# Patient Record
Sex: FEMALE | Race: WHITE | NOT HISPANIC OR LATINO | URBAN - METROPOLITAN AREA
[De-identification: names, ages, dates, MRNs, and addresses within clinical notes are randomized per-mention and may not be internally consistent; named-entity substitution may affect disease eponyms.]

---

## 2017-05-02 ENCOUNTER — EMERGENCY (EMERGENCY)
Facility: HOSPITAL | Age: 27
LOS: 1 days | Discharge: HOME | End: 2017-05-02
Attending: EMERGENCY MEDICINE
Payer: COMMERCIAL

## 2017-05-02 VITALS
DIASTOLIC BLOOD PRESSURE: 94 MMHG | SYSTOLIC BLOOD PRESSURE: 131 MMHG | HEART RATE: 128 BPM | WEIGHT: 149.91 LBS | HEIGHT: 62 IN | TEMPERATURE: 98 F | RESPIRATION RATE: 19 BRPM | OXYGEN SATURATION: 96 %

## 2017-05-02 DIAGNOSIS — R45.851 SUICIDAL IDEATIONS: ICD-10-CM

## 2017-05-02 DIAGNOSIS — Z87.09 PERSONAL HISTORY OF OTHER DISEASES OF THE RESPIRATORY SYSTEM: Chronic | ICD-10-CM

## 2017-05-02 DIAGNOSIS — Z90.89 ACQUIRED ABSENCE OF OTHER ORGANS: ICD-10-CM

## 2017-05-02 DIAGNOSIS — F10.10 ALCOHOL ABUSE, UNCOMPLICATED: ICD-10-CM

## 2017-05-02 DIAGNOSIS — F41.9 ANXIETY DISORDER, UNSPECIFIED: ICD-10-CM

## 2017-05-02 DIAGNOSIS — Z98.890 OTHER SPECIFIED POSTPROCEDURAL STATES: Chronic | ICD-10-CM

## 2017-05-02 LAB
ALBUMIN SERPL ELPH-MCNC: 4.5 G/DL — SIGNIFICANT CHANGE UP (ref 3.3–5.2)
ALP SERPL-CCNC: 46 U/L — SIGNIFICANT CHANGE UP (ref 40–120)
ALT FLD-CCNC: 20 U/L — SIGNIFICANT CHANGE UP
AMPHET UR-MCNC: NEGATIVE — SIGNIFICANT CHANGE UP
ANION GAP SERPL CALC-SCNC: 17 MMOL/L — SIGNIFICANT CHANGE UP (ref 5–17)
APAP SERPL-MCNC: <7.5 UG/ML — LOW (ref 10–26)
APPEARANCE UR: CLEAR — SIGNIFICANT CHANGE UP
AST SERPL-CCNC: 18 U/L — SIGNIFICANT CHANGE UP
BARBITURATES UR SCN-MCNC: NEGATIVE — SIGNIFICANT CHANGE UP
BASOPHILS # BLD AUTO: 0 K/UL — SIGNIFICANT CHANGE UP (ref 0–0.2)
BASOPHILS NFR BLD AUTO: 0.2 % — SIGNIFICANT CHANGE UP (ref 0–2)
BENZODIAZ UR-MCNC: NEGATIVE — SIGNIFICANT CHANGE UP
BILIRUB SERPL-MCNC: 0.2 MG/DL — LOW (ref 0.4–2)
BILIRUB UR-MCNC: NEGATIVE — SIGNIFICANT CHANGE UP
BUN SERPL-MCNC: 13 MG/DL — SIGNIFICANT CHANGE UP (ref 8–20)
CALCIUM SERPL-MCNC: 9.2 MG/DL — SIGNIFICANT CHANGE UP (ref 8.6–10.2)
CHLORIDE SERPL-SCNC: 105 MMOL/L — SIGNIFICANT CHANGE UP (ref 98–107)
CO2 SERPL-SCNC: 23 MMOL/L — SIGNIFICANT CHANGE UP (ref 22–29)
COCAINE METAB.OTHER UR-MCNC: NEGATIVE — SIGNIFICANT CHANGE UP
COLOR SPEC: YELLOW — SIGNIFICANT CHANGE UP
CREAT SERPL-MCNC: 0.56 MG/DL — SIGNIFICANT CHANGE UP (ref 0.5–1.3)
DIFF PNL FLD: NEGATIVE — SIGNIFICANT CHANGE UP
EOSINOPHIL # BLD AUTO: 0 K/UL — SIGNIFICANT CHANGE UP (ref 0–0.5)
EOSINOPHIL NFR BLD AUTO: 0.7 % — SIGNIFICANT CHANGE UP (ref 0–6)
ETHANOL SERPL-MCNC: 216 MG/DL — SIGNIFICANT CHANGE UP
GLUCOSE SERPL-MCNC: 107 MG/DL — SIGNIFICANT CHANGE UP (ref 70–115)
GLUCOSE UR QL: NEGATIVE MG/DL — SIGNIFICANT CHANGE UP
HCG SERPL-ACNC: <2 MIU/ML — SIGNIFICANT CHANGE UP
HCT VFR BLD CALC: 41.4 % — SIGNIFICANT CHANGE UP (ref 37–47)
HGB BLD-MCNC: 14 G/DL — SIGNIFICANT CHANGE UP (ref 12–16)
KETONES UR-MCNC: NEGATIVE — SIGNIFICANT CHANGE UP
LEUKOCYTE ESTERASE UR-ACNC: NEGATIVE — SIGNIFICANT CHANGE UP
LYMPHOCYTES # BLD AUTO: 1.4 K/UL — SIGNIFICANT CHANGE UP (ref 1–4.8)
LYMPHOCYTES # BLD AUTO: 24 % — SIGNIFICANT CHANGE UP (ref 20–55)
MCHC RBC-ENTMCNC: 31.7 PG — HIGH (ref 27–31)
MCHC RBC-ENTMCNC: 33.8 G/DL — SIGNIFICANT CHANGE UP (ref 32–36)
MCV RBC AUTO: 93.9 FL — SIGNIFICANT CHANGE UP (ref 81–99)
METHADONE UR-MCNC: NEGATIVE — SIGNIFICANT CHANGE UP
MONOCYTES # BLD AUTO: 0.4 K/UL — SIGNIFICANT CHANGE UP (ref 0–0.8)
MONOCYTES NFR BLD AUTO: 7.5 % — SIGNIFICANT CHANGE UP (ref 3–10)
NEUTROPHILS # BLD AUTO: 4 K/UL — SIGNIFICANT CHANGE UP (ref 1.8–8)
NEUTROPHILS NFR BLD AUTO: 67.4 % — SIGNIFICANT CHANGE UP (ref 37–73)
NITRITE UR-MCNC: NEGATIVE — SIGNIFICANT CHANGE UP
OPIATES UR-MCNC: NEGATIVE — SIGNIFICANT CHANGE UP
PCP SPEC-MCNC: SIGNIFICANT CHANGE UP
PCP UR-MCNC: NEGATIVE — SIGNIFICANT CHANGE UP
PH UR: 6 — SIGNIFICANT CHANGE UP (ref 5–8)
PLATELET # BLD AUTO: 239 K/UL — SIGNIFICANT CHANGE UP (ref 150–400)
POTASSIUM SERPL-MCNC: 3.8 MMOL/L — SIGNIFICANT CHANGE UP (ref 3.5–5.3)
POTASSIUM SERPL-SCNC: 3.8 MMOL/L — SIGNIFICANT CHANGE UP (ref 3.5–5.3)
PROT SERPL-MCNC: 7.2 G/DL — SIGNIFICANT CHANGE UP (ref 6.6–8.7)
PROT UR-MCNC: NEGATIVE MG/DL — SIGNIFICANT CHANGE UP
RBC # BLD: 4.41 M/UL — SIGNIFICANT CHANGE UP (ref 4.4–5.2)
RBC # FLD: 13.6 % — SIGNIFICANT CHANGE UP (ref 11–15.6)
SALICYLATES SERPL-MCNC: <2 MG/DL — LOW (ref 10–20)
SODIUM SERPL-SCNC: 145 MMOL/L — SIGNIFICANT CHANGE UP (ref 135–145)
SP GR SPEC: 1.02 — SIGNIFICANT CHANGE UP (ref 1.01–1.02)
THC UR QL: NEGATIVE — SIGNIFICANT CHANGE UP
UROBILINOGEN FLD QL: 1 MG/DL
WBC # BLD: 5.9 K/UL — SIGNIFICANT CHANGE UP (ref 4.8–10.8)
WBC # FLD AUTO: 5.9 K/UL — SIGNIFICANT CHANGE UP (ref 4.8–10.8)

## 2017-05-02 PROCEDURE — 99284 EMERGENCY DEPT VISIT MOD MDM: CPT

## 2017-05-02 NOTE — ED BEHAVIORAL HEALTH NOTE - BEHAVIORAL HEALTH NOTE
Patient father and stepmother Janie called and disclosed that she has abusing "pills, alcohol and cocaine". She has hx of SI with 2 past attempts. She has been to rehab  in [place] for 28 days. She has no hx of psychiatric tx but was in therapy in the past. Her mother Hellen ([phone]) called and reported that she tried to take a handful of pills in front of her tonight complaining that her life is horrible and she just wants to die. Her mother called 911 for an ambulance and the patient told her mother that "ill talk my way out of the hospital and ill kill myself when I get home".

## 2017-05-02 NOTE — ED ADULT NURSE NOTE - CHPI ED SYMPTOMS NEG
no change in level of consciousness/no agitation/no paranoia/no suicidal/no confusion/no homicidal/no weakness/no hallucinations/no disorientation

## 2017-05-02 NOTE — ED PROVIDER NOTE - NS ED MD SCRIBE ATTENDING SCRIBE SECTIONS
HISTORY OF PRESENT ILLNESS/PHYSICAL EXAM/VITAL SIGNS( Pullset)/PAST MEDICAL/SURGICAL/SOCIAL HISTORY/DISPOSITION/HIV/REVIEW OF SYSTEMS/INTAKE ASSESSMENT/SCREENINGS/RESULTS

## 2017-05-02 NOTE — ED PROVIDER NOTE - OBJECTIVE STATEMENT
27 y/o F presents to ED stating "this is a misunderstanding". Pt states she got into an argument with mother tonight. Pt states mother believed she was suicidal after taking 3 tabs of melatonin tonight and called EMS. Pt denies overdose. She denies hx of depression. Pt denies psych hx. She denies SI/HI.    As per  note, pt has hx of suicidal attempts x 2 in the past and has been in therapy/rehab. Collateral info obtained from mother. Pt voiced SI tonight and attempted to overdose by taking a hand full of pills.

## 2017-05-02 NOTE — ED ADULT NURSE NOTE - DISCHARGE TEACHING
return to local ED or call 911 if symptoms worsen or thoughts to harm self or others develop, follow up with resource referral as per SBIRT

## 2017-05-02 NOTE — ED ADULT NURSE NOTE - OBJECTIVE STATEMENT
Pt received in [facility] s/p "suicidal attempt" as per mother pt took 3 melatonin pills. Pt denies taking such pills. Pt admits to drinking about 4 shots of vodka while laying by her pool this afternoon. Pt denies drinking everyday. Pt with no medical hx. Pt states her mother and stepfather are "crazy and controlling" and that this entire thing is "absolutely ridiculous". Pt is a&ox3, speaking coherently and following commands. Respirations are even and unlabored with no sx's of SOB. Pt denies SI/HI. Clothing/belongings locked up.

## 2017-05-02 NOTE — ED ADULT TRIAGE NOTE - CHIEF COMPLAINT QUOTE
pt biba with reports of possible SI by mother at scene as per EMS. pt admits to alcohol, denies taking pills. 3 unknown pills found by patient, suspected to be melatonin by mother. pt biba with reports of possible SI by mother at scene as per EMS. pt admits to alcohol, denies taking pills. 3 pills found with patient at home, suspected to be melatonin by mother, and confirmed by patient that they are melatonin. pt cooperative upon arrival.

## 2017-05-02 NOTE — ED ADULT NURSE NOTE - CHIEF COMPLAINT QUOTE
pt biba with reports of possible SI by mother at scene as per EMS. pt admits to alcohol, denies taking pills. 3 pills found with patient at home, suspected to be melatonin by mother, and confirmed by patient that they are melatonin. pt cooperative upon arrival.

## 2017-05-02 NOTE — ED PROVIDER NOTE - CARE PLAN
Principal Discharge DX:	Depression Principal Discharge DX:	Anxiety state  Secondary Diagnosis:	Alcohol abuse

## 2017-05-03 VITALS
RESPIRATION RATE: 20 BRPM | HEART RATE: 98 BPM | OXYGEN SATURATION: 98 % | DIASTOLIC BLOOD PRESSURE: 90 MMHG | TEMPERATURE: 98 F | SYSTOLIC BLOOD PRESSURE: 129 MMHG

## 2017-05-03 DIAGNOSIS — F10.10 ALCOHOL ABUSE, UNCOMPLICATED: ICD-10-CM

## 2017-05-03 DIAGNOSIS — F41.1 GENERALIZED ANXIETY DISORDER: ICD-10-CM

## 2017-05-03 DIAGNOSIS — R69 ILLNESS, UNSPECIFIED: ICD-10-CM

## 2017-05-03 LAB — ETHANOL SERPL-MCNC: 106 MG/DL — SIGNIFICANT CHANGE UP

## 2017-05-03 PROCEDURE — 80307 DRUG TEST PRSMV CHEM ANLYZR: CPT

## 2017-05-03 PROCEDURE — 81003 URINALYSIS AUTO W/O SCOPE: CPT

## 2017-05-03 PROCEDURE — 85027 COMPLETE CBC AUTOMATED: CPT

## 2017-05-03 PROCEDURE — 99284 EMERGENCY DEPT VISIT MOD MDM: CPT | Mod: 25

## 2017-05-03 PROCEDURE — 80053 COMPREHEN METABOLIC PANEL: CPT

## 2017-05-03 PROCEDURE — 84702 CHORIONIC GONADOTROPIN TEST: CPT

## 2017-05-03 PROCEDURE — 90792 PSYCH DIAG EVAL W/MED SRVCS: CPT
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
## 2017-05-03 NOTE — ED ADULT NURSE REASSESSMENT NOTE - NS ED NURSE REASSESS COMMENT FT1
Pt. currently sleeping with 1:1 at the bedside. Waiting for BAL to decrease and talk to psych. Will continue to monitor.
Assumed care at 0730, resting in bed, no attempts to harm self or others, and safety maintained.
Pt arrived to  AOx3,calm and cooperative. Pt smiling during assessment. Pt reports she was day drinking yesterday and got into a fight with her mother. As per pt, "I don't remember exactly what I said but I said things I didn't mean." Pt denies any current SI/HI or any A/V/H. Pt reports she feels like she needs to see a psychiatrist for her mood swings and for her anxiety. Pt reports she has been feeling anxious for the past 8 months. Pt reports she has a problem with ETOH. Pt has been to rehab 3 years ago for ETOH abuse. Pt has no psychiatric hx and denies any past SA. Pt is on no medication. Pt's belongings securely locked up in . Will continue to monitor the pt for safety.
Pt transferred to  unit at this time to SHIL[facility]. Pt is a&ox3, speaking coherently, and following commands. Pt with steady gait to  unit. Bedside report given.

## 2017-05-03 NOTE — SBIRT NOTE. - NSSBIRTSERVICES_GEN_A_ED_FT
Naloxone Rescue Kit dispensed: Pt was educated about Naloxone and trained on how to assemble and utilize the kit.[id number]  Provided SBIRT services: Full screen positive. Referral to Treatment Performed. Screening results were reviewed with the patient and patient was provided information about healthy guidelines and potential negative consequences associated with level of risk. Motivation and readiness to reduce or stop use was discussed and goals and activities to make changes were suggested/offered.  Referral for complete assessment and level of care determination at a certified treatment facility was completed by contacting the treatment facility via phone, and add apt info as noted below:  Appt confirmed at: [facility] - Awaiting bed   Audit Score:33  DAST Score:3  Duration = 60 Minutes

## 2017-05-03 NOTE — ED BEHAVIORAL HEALTH ASSESSMENT NOTE - SUMMARY
27 yoF here after incident at home with alcohol excess and possible suicide threat; patient states that she has a problem with drinking too much alcohol and is motivated to get outpatient help.  Pt denies SI/HI, self-injurious behaviors, hallucinations/delusions/illusions.   Patient and mother consulted with SBIRT and currently deciding between inpatient and outpatient options that are suitable for patient.

## 2017-05-03 NOTE — ED ADULT NURSE REASSESSMENT NOTE - COMFORT CARE
wait time explained/po fluids offered/warm blanket provided/ambulated to bathroom/plan of care explained/darkened lights/repositioned

## 2018-01-22 ENCOUNTER — EMERGENCY (EMERGENCY)
Facility: HOSPITAL | Age: 28
LOS: 1 days | Discharge: HOME | End: 2018-01-22
Attending: EMERGENCY MEDICINE | Admitting: EMERGENCY MEDICINE
Payer: COMMERCIAL

## 2018-01-22 VITALS
SYSTOLIC BLOOD PRESSURE: 120 MMHG | RESPIRATION RATE: 18 BRPM | HEART RATE: 80 BPM | DIASTOLIC BLOOD PRESSURE: 74 MMHG | TEMPERATURE: 98 F | HEIGHT: 62 IN | WEIGHT: 125 LBS | OXYGEN SATURATION: 100 %

## 2018-01-22 VITALS
HEART RATE: 98 BPM | RESPIRATION RATE: 18 BRPM | SYSTOLIC BLOOD PRESSURE: 113 MMHG | OXYGEN SATURATION: 98 % | DIASTOLIC BLOOD PRESSURE: 69 MMHG | TEMPERATURE: 98 F

## 2018-01-22 DIAGNOSIS — Z87.09 PERSONAL HISTORY OF OTHER DISEASES OF THE RESPIRATORY SYSTEM: Chronic | ICD-10-CM

## 2018-01-22 DIAGNOSIS — Z98.890 OTHER SPECIFIED POSTPROCEDURAL STATES: Chronic | ICD-10-CM

## 2018-01-22 DIAGNOSIS — F19.10 OTHER PSYCHOACTIVE SUBSTANCE ABUSE, UNCOMPLICATED: ICD-10-CM

## 2018-01-22 DIAGNOSIS — F10.129 ALCOHOL ABUSE WITH INTOXICATION, UNSPECIFIED: ICD-10-CM

## 2018-01-22 DIAGNOSIS — F15.10 OTHER STIMULANT ABUSE, UNCOMPLICATED: ICD-10-CM

## 2018-01-22 DIAGNOSIS — F10.10 ALCOHOL ABUSE, UNCOMPLICATED: ICD-10-CM

## 2018-01-22 LAB
ALBUMIN SERPL ELPH-MCNC: 4.8 G/DL — SIGNIFICANT CHANGE UP (ref 3.3–5.2)
ALP SERPL-CCNC: 72 U/L — SIGNIFICANT CHANGE UP (ref 40–120)
ALT FLD-CCNC: 17 U/L — SIGNIFICANT CHANGE UP
AMPHET UR-MCNC: NEGATIVE — SIGNIFICANT CHANGE UP
ANION GAP SERPL CALC-SCNC: 15 MMOL/L — SIGNIFICANT CHANGE UP (ref 5–17)
APPEARANCE UR: CLEAR — SIGNIFICANT CHANGE UP
AST SERPL-CCNC: 23 U/L — SIGNIFICANT CHANGE UP
BACTERIA # UR AUTO: ABNORMAL
BARBITURATES UR SCN-MCNC: NEGATIVE — SIGNIFICANT CHANGE UP
BASOPHILS # BLD AUTO: 0 K/UL — SIGNIFICANT CHANGE UP (ref 0–0.2)
BASOPHILS NFR BLD AUTO: 0.2 % — SIGNIFICANT CHANGE UP (ref 0–2)
BENZODIAZ UR-MCNC: NEGATIVE — SIGNIFICANT CHANGE UP
BILIRUB SERPL-MCNC: 0.2 MG/DL — LOW (ref 0.4–2)
BILIRUB UR-MCNC: NEGATIVE — SIGNIFICANT CHANGE UP
BUN SERPL-MCNC: 9 MG/DL — SIGNIFICANT CHANGE UP (ref 8–20)
CALCIUM SERPL-MCNC: 9.9 MG/DL — SIGNIFICANT CHANGE UP (ref 8.6–10.2)
CHLORIDE SERPL-SCNC: 106 MMOL/L — SIGNIFICANT CHANGE UP (ref 98–107)
CO2 SERPL-SCNC: 25 MMOL/L — SIGNIFICANT CHANGE UP (ref 22–29)
COCAINE METAB.OTHER UR-MCNC: NEGATIVE — SIGNIFICANT CHANGE UP
COLOR SPEC: YELLOW — SIGNIFICANT CHANGE UP
CREAT SERPL-MCNC: 0.62 MG/DL — SIGNIFICANT CHANGE UP (ref 0.5–1.3)
DIFF PNL FLD: ABNORMAL
EOSINOPHIL # BLD AUTO: 0 K/UL — SIGNIFICANT CHANGE UP (ref 0–0.5)
EOSINOPHIL NFR BLD AUTO: 0.4 % — SIGNIFICANT CHANGE UP (ref 0–6)
EPI CELLS # UR: SIGNIFICANT CHANGE UP
ETHANOL SERPL-MCNC: 140 MG/DL — SIGNIFICANT CHANGE UP
GLUCOSE SERPL-MCNC: 105 MG/DL — SIGNIFICANT CHANGE UP (ref 70–115)
GLUCOSE UR QL: NEGATIVE MG/DL — SIGNIFICANT CHANGE UP
HCT VFR BLD CALC: 43.5 % — SIGNIFICANT CHANGE UP (ref 37–47)
HGB BLD-MCNC: 14.8 G/DL — SIGNIFICANT CHANGE UP (ref 12–16)
KETONES UR-MCNC: NEGATIVE — SIGNIFICANT CHANGE UP
LEUKOCYTE ESTERASE UR-ACNC: ABNORMAL
LYMPHOCYTES # BLD AUTO: 1.2 K/UL — SIGNIFICANT CHANGE UP (ref 1–4.8)
LYMPHOCYTES # BLD AUTO: 23.5 % — SIGNIFICANT CHANGE UP (ref 20–55)
MCHC RBC-ENTMCNC: 31.8 PG — HIGH (ref 27–31)
MCHC RBC-ENTMCNC: 34 G/DL — SIGNIFICANT CHANGE UP (ref 32–36)
MCV RBC AUTO: 93.3 FL — SIGNIFICANT CHANGE UP (ref 81–99)
METHADONE UR-MCNC: NEGATIVE — SIGNIFICANT CHANGE UP
MONOCYTES # BLD AUTO: 0.3 K/UL — SIGNIFICANT CHANGE UP (ref 0–0.8)
MONOCYTES NFR BLD AUTO: 5.5 % — SIGNIFICANT CHANGE UP (ref 3–10)
NEUTROPHILS # BLD AUTO: 3.7 K/UL — SIGNIFICANT CHANGE UP (ref 1.8–8)
NEUTROPHILS NFR BLD AUTO: 70.2 % — SIGNIFICANT CHANGE UP (ref 37–73)
NITRITE UR-MCNC: NEGATIVE — SIGNIFICANT CHANGE UP
OPIATES UR-MCNC: NEGATIVE — SIGNIFICANT CHANGE UP
PCP SPEC-MCNC: SIGNIFICANT CHANGE UP
PCP UR-MCNC: NEGATIVE — SIGNIFICANT CHANGE UP
PH UR: 6.5 — SIGNIFICANT CHANGE UP (ref 5–8)
PLATELET # BLD AUTO: 241 K/UL — SIGNIFICANT CHANGE UP (ref 150–400)
POTASSIUM SERPL-MCNC: 4.4 MMOL/L — SIGNIFICANT CHANGE UP (ref 3.5–5.3)
POTASSIUM SERPL-SCNC: 4.4 MMOL/L — SIGNIFICANT CHANGE UP (ref 3.5–5.3)
PROT SERPL-MCNC: 8 G/DL — SIGNIFICANT CHANGE UP (ref 6.6–8.7)
PROT UR-MCNC: 30 MG/DL
RBC # BLD: 4.66 M/UL — SIGNIFICANT CHANGE UP (ref 4.4–5.2)
RBC # FLD: 12.5 % — SIGNIFICANT CHANGE UP (ref 11–15.6)
RBC CASTS # UR COMP ASSIST: SIGNIFICANT CHANGE UP /HPF (ref 0–4)
SODIUM SERPL-SCNC: 146 MMOL/L — HIGH (ref 135–145)
SP GR SPEC: 1.01 — SIGNIFICANT CHANGE UP (ref 1.01–1.02)
THC UR QL: NEGATIVE — SIGNIFICANT CHANGE UP
UROBILINOGEN FLD QL: NEGATIVE MG/DL — SIGNIFICANT CHANGE UP
WBC # BLD: 5.3 K/UL — SIGNIFICANT CHANGE UP (ref 4.8–10.8)
WBC # FLD AUTO: 5.3 K/UL — SIGNIFICANT CHANGE UP (ref 4.8–10.8)
WBC UR QL: SIGNIFICANT CHANGE UP

## 2018-01-22 PROCEDURE — 93005 ELECTROCARDIOGRAM TRACING: CPT

## 2018-01-22 PROCEDURE — 80053 COMPREHEN METABOLIC PANEL: CPT

## 2018-01-22 PROCEDURE — 36415 COLL VENOUS BLD VENIPUNCTURE: CPT

## 2018-01-22 PROCEDURE — 85027 COMPLETE CBC AUTOMATED: CPT

## 2018-01-22 PROCEDURE — 99284 EMERGENCY DEPT VISIT MOD MDM: CPT

## 2018-01-22 PROCEDURE — 80307 DRUG TEST PRSMV CHEM ANLYZR: CPT

## 2018-01-22 PROCEDURE — 90792 PSYCH DIAG EVAL W/MED SRVCS: CPT

## 2018-01-22 PROCEDURE — 99284 EMERGENCY DEPT VISIT MOD MDM: CPT | Mod: 25

## 2018-01-22 PROCEDURE — 81001 URINALYSIS AUTO W/SCOPE: CPT

## 2018-01-22 PROCEDURE — 93010 ELECTROCARDIOGRAM REPORT: CPT

## 2018-01-22 RX ORDER — HALOPERIDOL DECANOATE 100 MG/ML
5 INJECTION INTRAMUSCULAR EVERY 6 HOURS
Qty: 0 | Refills: 0 | Status: DISCONTINUED | OUTPATIENT
Start: 2018-01-22 | End: 2018-01-26

## 2018-01-22 RX ORDER — ACETAMINOPHEN 500 MG
325 TABLET ORAL EVERY 4 HOURS
Qty: 0 | Refills: 0 | Status: DISCONTINUED | OUTPATIENT
Start: 2018-01-22 | End: 2018-01-26

## 2018-01-22 RX ORDER — FLUOXETINE HCL 10 MG
1 CAPSULE ORAL
Qty: 0 | Refills: 0 | COMMUNITY

## 2018-01-22 RX ORDER — DIPHENHYDRAMINE HCL 50 MG
50 CAPSULE ORAL EVERY 6 HOURS
Qty: 0 | Refills: 0 | Status: DISCONTINUED | OUTPATIENT
Start: 2018-01-22 | End: 2018-01-26

## 2018-01-22 RX ADMIN — Medication 325 MILLIGRAM(S): at 18:14

## 2018-01-22 NOTE — ED BEHAVIORAL HEALTH ASSESSMENT NOTE - DESCRIPTION (FIRST USE, LAST USE, QUANTITY, FREQUENCY, DURATION)
reports quitting 4 days ago hx of ETOH abuse hx of cocaine use, last months ago however per mother reported to family member using 1 week ago reports drug of choice is Adderall, last used weeks ago

## 2018-01-22 NOTE — ED ADULT NURSE NOTE - CHPI ED SYMPTOMS NEG
no suicidal/no weight loss/no disorientation/no homicidal/no weakness/no agitation/no confusion/no hallucinations/no change in level of consciousness/no paranoia

## 2018-01-22 NOTE — ED BEHAVIORAL HEALTH ASSESSMENT NOTE - SAFETY PLAN DETAILS
Please go to Nearest ER or call 911, If you notice any of the followin) Agitation, Aggression or Anxiety,    2) Suicidal or homicidal thoughts 3) Worsening of symptoms

## 2018-01-22 NOTE — ED ADULT NURSE NOTE - NSALCOHOLWITHDRAWAL_GEN_A_CORE_SD
diaphoresis/disorientation/nausea/agitation/difficulty concentrating/hallucinations/irritability/mood swings/seizures/tremor(s)/sleeplessness/vomiting
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
## 2018-01-22 NOTE — ED ADULT NURSE NOTE - PMH
No pertinent past medical history Seizure due to alcohol withdrawal  reports seizure following withdrawal

## 2018-01-22 NOTE — ED BEHAVIORAL HEALTH ASSESSMENT NOTE - DESCRIPTION
none employed full time as hairdresser Vital Signs Last 24 Hrs  T(C): 36.9 (22 Jan 2018 15:47), Max: 36.9 (22 Jan 2018 15:47)  T(F): 98.4 (22 Jan 2018 15:47), Max: 98.4 (22 Jan 2018 15:47)  HR: 124 (22 Jan 2018 15:47) (80 - 124)  BP: 110/70 (22 Jan 2018 15:47) (110/70 - 120/74)  BP(mean): --  RR: 18 (22 Jan 2018 15:47) (18 - 18)  SpO2: 98% (22 Jan 2018 15:47) (98% - 100%)

## 2018-01-22 NOTE — ED ADULT NURSE NOTE - NSALCOHOLUSE_GEN_A_CORE_FT
stated  age 19, went rehab age 24, then rehab [age]. Sober for 8 months relapse 12/ 20-till 1/21/18.  Has blackouts, hx of seizures. Last seizure 5/5./17.
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
## 2018-01-22 NOTE — ED ADULT NURSE REASSESSMENT NOTE - NS ED NURSE REASSESS COMMENT FT1
pt awake alert and oriented x3, denies suicidal or homicidal ideations, denies hallucinations a/v, pt states she is happy to go home and will take an Uber. Pt states she needs to make it to work in am.
Assumed care of pt @1930. Pt denies any current suicidal ideations. pt reports her parents are mad at her for relapsing on alcohol. Pt reports she was sober and relapsed several months ago. As per pt," I just want to go home." No other pt complaints at this time. Will continue to monitor the pt for safety.
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
## 2018-01-22 NOTE — ED PROVIDER NOTE - OBJECTIVE STATEMENT
28 y/o F pt BIBA for SI. Per EMS, she was heard saying she wants to cut herself; denies SI and wants to go home.

## 2018-01-22 NOTE — ED ADULT NURSE REASSESSMENT NOTE - CONDITION
Complained of headache, rated a 5/10. Denies nausea or vomiting, no diarrhea or diaphoresis. No tremors at this time. V/S 113/69 P 98 R 18 Po2 98% Pt has poor appitite but is thirsty. Pitcher of H2O given encouraged to increase fluid intake as tolerated.  Pt drank entire pitcher, Pt returned to bed. 2nd pitcher placed in room./deteriorated
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
## 2018-01-22 NOTE — ED BEHAVIORAL HEALTH ASSESSMENT NOTE - REFERRAL / APPOINTMENT DETAILS
Patient declined [facility] referral, gave information for [facility] should she change her mind. Reports she will follow up with AA.

## 2021-04-06 NOTE — ED ADULT NURSE NOTE - AUDITORY DISTURBANCE
IV infusing.  pt resting in poistion of comfort with mother at bedside.  pt 
updated on POC



report to Howard ALMONTE 0=not present

## 2021-05-19 NOTE — ED BEHAVIORAL HEALTH ASSESSMENT NOTE - NS ED BHA MSE SPEECH ARTICULATION
Quality 431: Preventive Care And Screening: Unhealthy Alcohol Use - Screening: Patient screened for unhealthy alcohol use using a single question and scores less than 2 times per year
Quality 226: Preventive Care And Screening: Tobacco Use: Screening And Cessation Intervention: Patient screened for tobacco use and is an ex/non-smoker
Detail Level: Detailed
Quality 130: Documentation Of Current Medications In The Medical Record: Current Medications Documented
Normal

## 2023-04-13 ENCOUNTER — EMERGENCY (EMERGENCY)
Facility: HOSPITAL | Age: 33
LOS: 1 days | Discharge: HOME | End: 2023-04-13
Attending: EMERGENCY MEDICINE
Payer: COMMERCIAL

## 2023-04-13 VITALS
RESPIRATION RATE: 16 BRPM | OXYGEN SATURATION: 97 % | HEART RATE: 124 BPM | SYSTOLIC BLOOD PRESSURE: 126 MMHG | WEIGHT: 160.06 LBS | DIASTOLIC BLOOD PRESSURE: 82 MMHG | TEMPERATURE: 98 F

## 2023-04-13 DIAGNOSIS — Z98.890 OTHER SPECIFIED POSTPROCEDURAL STATES: Chronic | ICD-10-CM

## 2023-04-13 DIAGNOSIS — Z87.09 PERSONAL HISTORY OF OTHER DISEASES OF THE RESPIRATORY SYSTEM: Chronic | ICD-10-CM

## 2023-04-13 PROCEDURE — 90792 PSYCH DIAG EVAL W/MED SRVCS: CPT

## 2023-04-13 PROCEDURE — 99285 EMERGENCY DEPT VISIT HI MDM: CPT

## 2023-04-13 RX ORDER — ACETAMINOPHEN 500 MG
650 TABLET ORAL ONCE
Refills: 0 | Status: COMPLETED | OUTPATIENT
Start: 2023-04-13 | End: 2023-04-13

## 2023-04-13 RX ADMIN — Medication 650 MILLIGRAM(S): at 23:23
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
## 2023-04-14 VITALS
SYSTOLIC BLOOD PRESSURE: 137 MMHG | HEART RATE: 97 BPM | TEMPERATURE: 98 F | DIASTOLIC BLOOD PRESSURE: 87 MMHG | RESPIRATION RATE: 18 BRPM | OXYGEN SATURATION: 99 %

## 2023-04-14 DIAGNOSIS — F10.20 ALCOHOL DEPENDENCE, UNCOMPLICATED: ICD-10-CM

## 2023-04-14 PROBLEM — F10.239 ALCOHOL DEPENDENCE WITH WITHDRAWAL, UNSPECIFIED: Chronic | Status: ACTIVE | Noted: 2018-01-22

## 2023-04-14 LAB
AMPHET UR-MCNC: NEGATIVE — SIGNIFICANT CHANGE UP
ANION GAP SERPL CALC-SCNC: 11 MMOL/L — SIGNIFICANT CHANGE UP (ref 5–17)
APAP SERPL-MCNC: <3 UG/ML — LOW (ref 10–26)
APPEARANCE UR: CLEAR — SIGNIFICANT CHANGE UP
BARBITURATES UR SCN-MCNC: NEGATIVE — SIGNIFICANT CHANGE UP
BASOPHILS # BLD AUTO: 0.03 K/UL — SIGNIFICANT CHANGE UP (ref 0–0.2)
BASOPHILS NFR BLD AUTO: 0.3 % — SIGNIFICANT CHANGE UP (ref 0–2)
BENZODIAZ UR-MCNC: NEGATIVE — SIGNIFICANT CHANGE UP
BILIRUB UR-MCNC: NEGATIVE — SIGNIFICANT CHANGE UP
BUN SERPL-MCNC: 8.4 MG/DL — SIGNIFICANT CHANGE UP (ref 8–20)
CALCIUM SERPL-MCNC: 8.6 MG/DL — SIGNIFICANT CHANGE UP (ref 8.4–10.5)
CHLORIDE SERPL-SCNC: 104 MMOL/L — SIGNIFICANT CHANGE UP (ref 96–108)
CO2 SERPL-SCNC: 26 MMOL/L — SIGNIFICANT CHANGE UP (ref 22–29)
COCAINE METAB.OTHER UR-MCNC: NEGATIVE — SIGNIFICANT CHANGE UP
COLOR SPEC: YELLOW — SIGNIFICANT CHANGE UP
CREAT SERPL-MCNC: 0.48 MG/DL — LOW (ref 0.5–1.3)
DIFF PNL FLD: NEGATIVE — SIGNIFICANT CHANGE UP
EGFR: 131 ML/MIN/1.73M2 — SIGNIFICANT CHANGE UP
EOSINOPHIL # BLD AUTO: 0.16 K/UL — SIGNIFICANT CHANGE UP (ref 0–0.5)
EOSINOPHIL NFR BLD AUTO: 1.5 % — SIGNIFICANT CHANGE UP (ref 0–6)
ETHANOL SERPL-MCNC: 54 MG/DL — HIGH (ref 0–9)
FLUAV AG NPH QL: SIGNIFICANT CHANGE UP
FLUBV AG NPH QL: SIGNIFICANT CHANGE UP
GLUCOSE SERPL-MCNC: 104 MG/DL — HIGH (ref 70–99)
GLUCOSE UR QL: NEGATIVE MG/DL — SIGNIFICANT CHANGE UP
HCG SERPL-ACNC: <4 MIU/ML — SIGNIFICANT CHANGE UP
HCT VFR BLD CALC: 40.5 % — SIGNIFICANT CHANGE UP (ref 34.5–45)
HGB BLD-MCNC: 13.6 G/DL — SIGNIFICANT CHANGE UP (ref 11.5–15.5)
IMM GRANULOCYTES NFR BLD AUTO: 0.4 % — SIGNIFICANT CHANGE UP (ref 0–0.9)
KETONES UR-MCNC: NEGATIVE — SIGNIFICANT CHANGE UP
LEUKOCYTE ESTERASE UR-ACNC: NEGATIVE — SIGNIFICANT CHANGE UP
LYMPHOCYTES # BLD AUTO: 1.94 K/UL — SIGNIFICANT CHANGE UP (ref 1–3.3)
LYMPHOCYTES # BLD AUTO: 18.1 % — SIGNIFICANT CHANGE UP (ref 13–44)
MCHC RBC-ENTMCNC: 31 PG — SIGNIFICANT CHANGE UP (ref 27–34)
MCHC RBC-ENTMCNC: 33.6 GM/DL — SIGNIFICANT CHANGE UP (ref 32–36)
MCV RBC AUTO: 92.3 FL — SIGNIFICANT CHANGE UP (ref 80–100)
METHADONE UR-MCNC: NEGATIVE — SIGNIFICANT CHANGE UP
MONOCYTES # BLD AUTO: 0.47 K/UL — SIGNIFICANT CHANGE UP (ref 0–0.9)
MONOCYTES NFR BLD AUTO: 4.4 % — SIGNIFICANT CHANGE UP (ref 2–14)
NEUTROPHILS # BLD AUTO: 8.06 K/UL — HIGH (ref 1.8–7.4)
NEUTROPHILS NFR BLD AUTO: 75.3 % — SIGNIFICANT CHANGE UP (ref 43–77)
NITRITE UR-MCNC: NEGATIVE — SIGNIFICANT CHANGE UP
OPIATES UR-MCNC: NEGATIVE — SIGNIFICANT CHANGE UP
PCP SPEC-MCNC: SIGNIFICANT CHANGE UP
PCP UR-MCNC: NEGATIVE — SIGNIFICANT CHANGE UP
PH UR: 8 — SIGNIFICANT CHANGE UP (ref 5–8)
PLATELET # BLD AUTO: 256 K/UL — SIGNIFICANT CHANGE UP (ref 150–400)
POTASSIUM SERPL-MCNC: 4.4 MMOL/L — SIGNIFICANT CHANGE UP (ref 3.5–5.3)
POTASSIUM SERPL-SCNC: 4.4 MMOL/L — SIGNIFICANT CHANGE UP (ref 3.5–5.3)
PROT UR-MCNC: NEGATIVE — SIGNIFICANT CHANGE UP
RBC # BLD: 4.39 M/UL — SIGNIFICANT CHANGE UP (ref 3.8–5.2)
RBC # FLD: 12.9 % — SIGNIFICANT CHANGE UP (ref 10.3–14.5)
RSV RNA NPH QL NAA+NON-PROBE: SIGNIFICANT CHANGE UP
SALICYLATES SERPL-MCNC: <0.6 MG/DL — LOW (ref 10–20)
SARS-COV-2 RNA SPEC QL NAA+PROBE: SIGNIFICANT CHANGE UP
SARS-COV-2 RNA SPEC QL NAA+PROBE: SIGNIFICANT CHANGE UP
SODIUM SERPL-SCNC: 141 MMOL/L — SIGNIFICANT CHANGE UP (ref 135–145)
SP GR SPEC: 1.01 — SIGNIFICANT CHANGE UP (ref 1.01–1.02)
THC UR QL: NEGATIVE — SIGNIFICANT CHANGE UP
UROBILINOGEN FLD QL: 1 MG/DL
WBC # BLD: 10.7 K/UL — HIGH (ref 3.8–10.5)
WBC # FLD AUTO: 10.7 K/UL — HIGH (ref 3.8–10.5)

## 2023-04-14 PROCEDURE — 99285 EMERGENCY DEPT VISIT HI MDM: CPT | Mod: 25

## 2023-04-14 PROCEDURE — U0005: CPT

## 2023-04-14 PROCEDURE — 73030 X-RAY EXAM OF SHOULDER: CPT | Mod: 26,RT

## 2023-04-14 PROCEDURE — U0003: CPT

## 2023-04-14 PROCEDURE — 85025 COMPLETE CBC W/AUTO DIFF WBC: CPT

## 2023-04-14 PROCEDURE — 84702 CHORIONIC GONADOTROPIN TEST: CPT

## 2023-04-14 PROCEDURE — 80307 DRUG TEST PRSMV CHEM ANLYZR: CPT

## 2023-04-14 PROCEDURE — 93010 ELECTROCARDIOGRAM REPORT: CPT

## 2023-04-14 PROCEDURE — 73030 X-RAY EXAM OF SHOULDER: CPT

## 2023-04-14 PROCEDURE — 81003 URINALYSIS AUTO W/O SCOPE: CPT

## 2023-04-14 PROCEDURE — 93005 ELECTROCARDIOGRAM TRACING: CPT

## 2023-04-14 PROCEDURE — 36415 COLL VENOUS BLD VENIPUNCTURE: CPT

## 2023-04-14 PROCEDURE — 80048 BASIC METABOLIC PNL TOTAL CA: CPT

## 2023-04-14 PROCEDURE — 87637 SARSCOV2&INF A&B&RSV AMP PRB: CPT

## 2023-04-14 RX ORDER — HYDROXYZINE HCL 10 MG
25 TABLET ORAL EVERY 6 HOURS
Refills: 0 | Status: DISCONTINUED | OUTPATIENT
Start: 2023-04-14 | End: 2023-04-21

## 2023-04-14 RX ORDER — ACETAMINOPHEN 500 MG
650 TABLET ORAL ONCE
Refills: 0 | Status: COMPLETED | OUTPATIENT
Start: 2023-04-14 | End: 2023-04-14

## 2023-04-14 RX ADMIN — Medication 650 MILLIGRAM(S): at 02:58

## 2023-04-14 RX ADMIN — Medication 650 MILLIGRAM(S): at 13:21

## 2023-04-14 RX ADMIN — Medication 650 MILLIGRAM(S): at 12:03

## 2023-04-14 RX ADMIN — Medication 25 MILLIGRAM(S): at 12:03

## 2023-04-14 RX ADMIN — Medication 100 MILLIGRAM(S): at 18:24

## 2023-04-14 NOTE — SBIRT NOTE ADULT - NSSBIRTALCPASSREFTXDET_GEN_A_CORE
Provided SBIRT services: Full Screen Positive. Brief Intervention and Referral to Treatment Performed.  Screening results were reviewed with the patient and patient was provided information about healthy guidelines and   potential negative consequences associated with level of risk. Motivation and readiness to reduce or stop use was   discussed and goals and activities to make changes were suggested/offered.
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
## 2023-04-14 NOTE — SBIRT NOTE ADULT - NSSBIRTDRGACTIVEREFTXDET_GEN_A_CORE
ovided SBIRT services: Full Screen Positive. Brief Intervention Performed and Referral to Treatment Attempted.  Screening results were reviewed with the patient and patient was provided information about healthy guidelines and   potential negative consequences associated with level of risk. Motivation and readiness to reduce or stop use was   discussed and goals and activities to make changes were suggested/offered.

## 2023-04-14 NOTE — ED ADULT NURSE NOTE - EXTENSIONS OF SELF_ADULT
Medicare Wellness Visit  Plan for Preventive Care    A good way for you to stay healthy is to use preventive care.  Medicare covers many services that can help you stay healthy.* The goal of these services is to find any health problems as quickly as possible. Finding problems early can help make them easier to treat.  Your personal plan below lists the services you may need and when they are due.     Health Maintenance Summary     Diabetes Eye Exam (Yearly)  Overdue - never done    DTaP/Tdap/Td Vaccine (1 - Tdap)  Overdue - never done    Diabetes Foot Exam (Yearly)  Overdue since 8/4/2017    Osteoporosis Screening (Once)  Overdue - never done    Diabetes A1C (Every 6 Months)  Ordered on 6/28/2021    DM/CKD Microalbumin (Yearly)  Ordered on 6/28/2021    DM/CKD GFR (Yearly)  Ordered on 6/28/2021    Medicare Wellness Visit (Yearly)  Next due on 6/28/2022    Depression Screening (Yearly)  Next due on 6/28/2022    Breast Cancer Screening (Every 2 Years)  Next due on 3/10/2023    Colonoscopy Risk (Every 5 Years)  Next due on 10/7/2024    Hepatitis C Screening   Completed    Influenza Vaccine   Completed    Pneumococcal Vaccine 65+   Completed    COVID-19 Vaccine   Completed    Shingles Vaccine   Completed    Hepatitis B Vaccine   Aged Out    Meningococcal Vaccine   Aged Out    HPV Vaccine   Aged Out           Preventive Care for Women and Men    Heart Screenings (Cardiovascular):  · Blood tests are used to check your cholesterol, lipid and triglyceride levels. High levels can increase your risk for heart disease and stroke. High levels can be treated with medications, diet and exercise. Lowering your levels can help keep your heart and blood vessels healthy.  Your provider will order these tests if they are needed.    · An ultrasound is done to see if you have an abdominal aortic aneurysm (AAA).  This is an enlargement of one of the main blood vessels that delivers blood to the body.   In the [place], 9,000  deaths are caused by AAA.  You may not even know you have this problem and as many as 1 in 3 people will have a serious problem if it is not treated.  Early diagnosis allows for more effective treatment and cure.  If you have a family history of AAA or are a male age 65-75 who has smoked, you are at higher risk of an AAA.  Your provider can order this test, if needed.    Colorectal Screening:  · There are many tests that are used to check for cancer of your colon and rectum. You and your provider should discuss what test is best for you and when to have it done.  Options include:  · Screening Colonoscopy: exam of the entire colon, seen through a flexible lighted tube.  · Flexible Sigmoidoscopy: exam of the last third (sigmoid portion) of the colon and rectum, seen through a flexible lighted tube.  · Cologuard DNA stool test: a sample of your stool is used to screen for cancer and unseen blood in your stool.  · Fecal Occult Blood Test: a sample of your stool is studied to find any unseen blood    Flu Shot:  · An immunization that helps to prevent influenza (the flu). You should get this every year. The best time to get the shot is in the fall.    Pneumococcal Shot:  • Vaccines are available that can help prevent pneumococcal disease, which is any type of infection caused by Streptococcus pneumoniae bacteria.   Their use can prevent some cases of pneumonia, meningitis, and sepsis. There are two types of pneumococcal vaccines:   o Conjugate vaccines (PCV-13 or Prevnar 13®) - helps protect against the 13 types of pneumococcal bacteria that are the most common causes of serious infections in children and adults.    o Polysaccharide vaccine (PPSV23 or [id number]®) - helps protect against 23 types of pneumococcal bacteria for patients who are recommended to get it.  These vaccines should be given at least 12 months apart.  A booster is usually not needed.     Hepatitis B Shot:  · An immunization that helps to protect  people from getting Hepatitis B. Hepatitis B is a virus that spreads through contact with infected blood or body fluids. Many people with the virus do not have symptoms.  The virus can lead to serious problems, such as liver disease. Some people are at higher risk than others. Your doctor will tell you if you need this shot.     Diabetes Screening:  · A test to measure sugar (glucose) in your blood is called a fasting blood sugar. Fasting means you cannot have food or drink for at least 8 hours before the test. This test can detect diabetes long before you may notice symptoms.    Glaucoma Screening:  · Glaucoma screening is performed by your eye doctor. The test measures the fluid pressure inside your eyes to determine if you have glaucoma.     Hepatitis C Screening:  · A blood test to see if you have the hepatitis C virus.  Hepatitis C attacks the liver and is a major cause of chronic liver disease.  Medicare will cover a single screening for all adults born between 1945 & 1965, or high risk patients (people who have injected illegal drugs or people who have had blood transfusions).  High risk patients who continue to inject illegal drugs can be screened for Hepatitis C every year.    Smoking and Tobacco-Use Cessation Counseling:  · Tobacco is the single greatest cause of disease and early death in our country today. Medication and counseling together can increase a person’s chance of quitting for good.   · Medicare covers two quitting attempts per year, with four counseling sessions per attempt (eight sessions in a 12 month period)    Preventive Screening tests for Women    Screening Mammograms and Breast Exams:  · An x-ray of your breasts to check for breast cancer before you or your doctor may be able to feel it.  If breast cancer is found early it can usually be treated with success.    Pelvic Exams and Pap Tests:  · An exam to check for cervical and vaginal cancer. A Pap test is a lab test in which cells are  taken from your cervix and sent to the lab to look for signs of cervical cancer. If cancer of the cervix is found early, chances for a cure are good. Testing can generally end at age 65, or if a woman has a hysterectomy for a benign condition. Your provider may recommend more frequent testing if certain abnormal results are found.    Bone Mass Measurements:  · A painless x-ray of your bone density to see if you are at risk for a broken bone. Bone density refers to the thickness of bones or how tightly the bone tissue is packed.    Preventive Screening tests for Men    Prostate Screening:  · Should you have a prostate cancer test (PSA)?  It is up to you to decide if you want a prostate cancer test. Talk to your clinician to find out if the test is right for you.  Things for you to consider and talk about should include:  · Benefits and harms of the test  · Your family history  · How your race/ethnicity may influence the test  · If the test may impact other medical conditions you have  · Your values on screenings and treatments    *Medicare pays for many preventive services to keep you healthy. For some of these services, you might have to pay a deductible, coinsurance, and / or copayment.  The amounts vary depending on the type of services you need and the kind of Medicare health plan you have.    For further details on screenings offered by Medicare please visit: https://www.medicare.gov/coverage/preventive-screening-services    None
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
## 2023-04-14 NOTE — ED PROVIDER NOTE - PROGRESS NOTE DETAILS
spoke with patient she is requesting go home she is ambulating no acute distress no suicidal homicidal ideation return precautions given to patient no signs of trauma Daniela: Pt was in ED awaiting dc, now telling RN she is suicidal and is not safe to go home. Labs ordered. WOODROW sebastian in AM Patient cleared by psychiatry. On SBIRT evaluation patient would benefit from rehab for alcohol. Sign-out given to [facility] doctor. Patient stable for transfer. Per Dr. Velasquez from [facility] will give Librium prior to her getting on ambulance. Valeria Hogan MD PGY-2

## 2023-04-14 NOTE — ED BEHAVIORAL HEALTH ASSESSMENT NOTE - DETAILS
N/A Alvarez BUI , is concerned that the patient may take off with her child [facility] Patient stated if she was to get discharged home today, she would "drink herself to death" "Excruciating whole body pain" 16 month old discussed with patient, to be referred to rehab facility for ETOH use [facility]

## 2023-04-14 NOTE — ED ADULT NURSE REASSESSMENT NOTE - NS ED NURSE REASSESS COMMENT FT1
Assumed care from previous RN. patient is A&Ox4, in NAD. Patient is c/o right shoulder pain. Patient to be sent to  for suicidal thoughts and not wanting to go back home. Patient in yellow gown with belongings secured. Aware of POC.

## 2023-04-14 NOTE — ED PROVIDER NOTE - ENDOCRINE NEGATIVE STATEMENT, MLM
PULMONARY & CRITICAL CARE CONSULT NOTE    Physician performing consultation: Dr Dharmesh Olea     I was asked to see Noemí Contreras at the request of Ruddy Bray MD in Pulmonary/Critical Care consultation.    Date of Admission: 8/29/2019  Date of Consultation: 8/30/2019      Reason for Consultation:  Recurrent Pneumonia    History of Present Illness: Noemí Contreras is a 30 year old female who was admitted to the hospital with pneumonia and urinary tract infection. Patient with recent hospitalization here 8/22-8/27 for Lupus exacerbation, left lower lobe pneumonia, UTI (culture negative) and fever. She was discharged home on Levaquin and Prednisone. Patient states over the last few days she has continued to have left sided pleuritic chest pain that worsened Thursday and she developed chills. In the ED: temperature 101.2F, wbc 8.1, lactic acid 0.9, UA with moderate leukoesterase and few bacteria, and negative procalcitonin. Chest x-ray with left basilar consolidation. She received morphine, Cefepime, zofran, tylenol, and 1L NS bolus in ED. She was admitted to telemetry status in ICU. She is on room air, no acute distress. Reports fatigue, improved shortness of breath, able to speak in full sentences without difficulty, and reports minimal thin phlegm production. Non-smoker and no known underlying lung disease. Pulmonary has been consulted for pneumonia.     Assessment:  Lupus exacerbation  Left lower lobe pneumonia  Abnormal UA  Fever    Plan:  Respiratory status is stable on room air.   Encourage good pulmonary hygiene with incentive spirometry and flutter.   CT chest reviewed by Dr Olea: left lower infiltrate with minimal pleural effusion. Do not recommend thoracentesis.   ID consulted, on azith/cefepime/vanc. BCx NGTD. Wbc normal, lactic normal. Trend fever curve.  Urine antigens and viral panel negative during last hospitalization. Further infectious work up per ID.   Procalcitonin again  negative(like recent hospitalization). ? If truly pneumonia. Again, recommend CT chest in 8 weeks to follow up resolution.   Continue prn pain medications for pleuritic chest pain. Will add lido patches, indomethacin  Rheum consulted, currently on Solumedrol 125mg q8hr, on Plaquenil  Daily labs, supportive care  Full code  DVT/GI prophylaxis  Has follow up appt with pulm NP on 9/19 and Dr Meng on 10/29.      Thank you, Ruddy Bray MD, for allowing me to participate in the care of this patient. If you have any questions, please do not hesitate to contact me. Myself and my colleagues at [facility] will continue to follow along with you.     On 8-30-19, I, DARIELA Dos Santos scribed the services personally performed by Dr Dharmesh Olea.    Pulmonary/Critical Care Staff: Patient seen and examined with the NP and the note has been completed to reflect any changes as needed.  Has felt better since readmission with no significant fever. She is on IV steroids and broad-spectrum antibiotics. CT scan by my review shows minimal left pleural effusion and only slight increased in dependent opacification. She has had clinical improvement, we'll continue with broad-spectrum antibiotics and supportive care as ongoing. Await culture results. She is immunocompromised and may need bronchoscopy if she were to worsen but will await infectious studies and monitor her clinical progress at the time being. I did discuss the possible need for bronchoscopy with the patient and she would like to hold off as she seems to be getting better. I think this is very reasonable. Cream plan was discussed with the ICU team. She is clinically stable from pulmonary standpoint to transfer out of intensive care unit. Dharmesh Olea MD      ACTIVE PROBLEM LIST     Patient Active Problem List   Diagnosis   • Iron deficiency anemia   • Leukopenia   • Acute febrile illness   • Mitral valve mass   • Neutropenia (CMS/HCC)    • Other organ or system involvement in systemic lupus erythematosus (CMS/HCC)   • Other long term (current) drug therapy   • Other specific arthropathies, not elsewhere classified, multiple sites   • Raynaud's syndrome without gangrene   • Rheumatoid factor positive   • Vitamin D deficiency        ALLERGIES & MEDICATIONS     ALLERGIES:   Allergen Reactions   • Iodine   (Environmental Or Med) ANAPHYLAXIS     Other reaction(s): Anaphylaxis, EENT - angioedema   • Ampicillin Other (See Comments)     Low BP    Patient has tolerated ceftriaxone and cefadroxil in the past       Scheduled Medications:  • vancomycin (VANCOCIN) IVPB  1,500 mg Intravenous 2 times per day   • hydroxychloroquine  200 mg Oral Daily   • lidocaine  1 patch Transdermal Daily   • pantoprazole  40 mg Oral Nightly   • indomethacin  25 mg Oral TID WC   • methylPREDNISolone  125 mg Intravenous 3 times per day   • VANCOMYCIN - PHARMACIST MONITORED   Does not apply See Admin Instructions   • cefepime (MAXIPIME) IVPB for Most Indications  1,000 mg Intravenous 3 times per day   • azithromycin  500 mg Intravenous Nightly   • sodium chloride (PF)  2 mL Intracatheter 2 times per day   • enoxaparin  40 mg Subcutaneous Daily         HISTORY     Past Medical History:   Diagnosis Date   • Acute parotitis    • Cervical lymphadenopathy    • Fever    • Iron deficiency anemia    • Leukopenia    • Lupus (systemic lupus erythematosus) (CMS/HCC)    • Mixed connective tissue disease (CMS/HCC)    • Neutropenia (CMS/HCC)    • Other long term (current) drug therapy    • Other specific arthropathies, not elsewhere classified, multiple sites    • Pleurisy    • Raynaud's syndrome without gangrene    • Rheumatoid factor positive    • Shingles    • Thrombocytopenia (CMS/HCC)    • Vitamin D deficiency, unspecified         History reviewed. No pertinent surgical history.     Social History     Tobacco Use   • Smoking status: Never Smoker   • Smokeless tobacco: Never Used    Substance Use Topics   • Alcohol use: No     Alcohol/week: 0.0 standard drinks   • Drug use: No       Occupational history:  Review of patient's social economics indicates:  No social economics status on file      Family History   Problem Relation Age of Onset   • Bipolar disorder Brother         twin brothers   • Sickle Cell Trait Brother    • Schizophrenia Brother         twin brothers   • Sickle Cell Trait Brother        REVIEW OF SYSTEMS     A comprehensive 14-point review of systems was conducted and reviewed with the patient. Please refer to HPI for findings.     PHYSICAL EXAM     Visit Vitals  /82 (BP Location: [facility], Patient Position: Semi-Greenberg's)   Pulse 90   Temp 98.6 °F (37 °C) (Oral)   Resp 18   Ht 5' 7\" (1.702 m)   Wt 60.5 kg   LMP 2019   SpO2 98%   BMI 20.89 kg/m²       GENERAL: alert, is in no apparent distress, room air, appears fatigued   SKIN: normal color, warm, dry   HEAD: normocephalic, atraumatic  EYES: pupils are equal, sclera and conjunctiva are normal  MOUTH/THROAT: oropharynx appears normal, oral mucosa is unremarkable  NECK: supple, trachea midline  CHEST: respiratory effort is not labored, equal expansion bilaterally. No pursed lip breathing.  LUNGS: clear bilaterally without rhonchi, crackles, wheeze, dim to left base  HEART: normal rate and rhythm, S1 and S2 normal, no murmur  ABDOMEN: soft, non-tender, non-distended, normoactive bowel sounds  NEUROLOGIC: Awake, alert, oriented. Moves all four extremities, no focal deficits  EXTREMITIES: no clubbing, no cyanosis, no edema. Pulses equal bilaterally  PSYCHIATRIC: Calm, cooperative, answers questions appropriately.     OBJECTIVE DATA     Laboratory Results:  Recent Labs     19  1828 19  0610   SODIUM 140 138   POTASSIUM 2.8* 3.6   CHLORIDE 103 104   CO2 29 30   BUN 8 8   CREATININE 0.59 0.56   GLUCOSE 86 126*   WBC 8.1 10.1   HGB 9.9* 8.9*    267       Imagin-29 CT chest:  1.  Patchy consolidative  changes throughout the lower lungs, increased from  prior exam, compatible with patient's history of pneumonia.  2.  Small bilateral pleural effusions.  3.  Small pericardial effusion.    8-29 CXR:Increasing left lower lobe pneumonia.    Weight over the past 48 Hours:  Patient Vitals for the past 48 hrs:   Weight   08/29/19 1755 62 kg   08/29/19 2049 60.5 kg   08/29/19 2050 60.5 kg           no diabetes and no thyroid trouble.
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
## 2023-04-14 NOTE — ED PROVIDER NOTE - PATIENT PORTAL LINK FT
You can access the FollowMyHealth Patient Portal offered by [facility] by registering at the following website: [email or web address]. By joining [facility]’s FollowMyHealth portal, you will also be able to view your health information using other applications (apps) compatible with our system. You can access the FollowMyHealth Patient Portal offered by [facility] by registering at the following website: [email or web address]. By joining [facility]’s FollowMyHealth portal, you will also be able to view your health information using other applications (apps) compatible with our system.

## 2023-04-14 NOTE — ED BEHAVIORAL HEALTH ASSESSMENT NOTE - NSBHATTESTAPPBILLTIME_PSY_A_CORE
I attest my time as TOMMIE is greater than 50% of the total combined time spent on qualifying patient care activities. I have reviewed and verified the documentation.

## 2023-04-14 NOTE — CHART NOTE - NSCHARTNOTEFT_GEN_A_CORE
SW Note: Notified by [facility] that the pt has been accepted to [facility] in detox. Accepting Dr. Velasquez. Met with pt and she signed HIPPA form. Spoke with pts mother Hellen via phone and confirmed she has pts child Jaime.  SHILPA Kan called and gave report. Faxed appropriate clinicals to detox unit # [phone]. [facility] arranged. [facility] letter provided. ED provider aware

## 2023-04-14 NOTE — ED PROVIDER NOTE - CLINICAL SUMMARY MEDICAL DECISION MAKING FREE TEXT BOX
spoke with patient she is requesting go home she is ambulating no acute distress no suicidal homicidal ideation return precautions given to patient no signs of trauma spoke with patient she is requesting go home she is ambulating no acute distress no suicidal homicidal ideation return precautions given to patient no signs of trauma    Patient cleared by psychiatry. On SB[facility] evaluation patient would benefit from rehab for alcohol. Sign-out given to [facility] doctor. Patient stable for transfer. Per Dr. Velasquez from [facility] will give Librium prior to her getting on ambulance. Valeria Hogan MD PGY-2

## 2023-04-14 NOTE — ED BEHAVIORAL HEALTH ASSESSMENT NOTE - DESCRIPTION
Denies Chronic alcohol use Patient was calm and cooperative in the ED and did not exhibit any aggression. Pt did not require any prn medications or any physical restraints.    Vital Signs Last 24 Hrs  T(C): 36.7 (14 Apr 2023 08:12), Max: 36.7 (13 Apr 2023 19:45)  T(F): 98.1 (14 Apr 2023 08:12), Max: 98.1 (13 Apr 2023 19:45)    HR: 92 (14 Apr 2023 08:12) (92 - 124)  BP: 128/89 (14 Apr 2023 08:12) (104/66 - 128/89)  BP(mean): --  RR: 18 (14 Apr 2023 08:12) (16 - 20)  SpO2: 96% (14 Apr 2023 08:12) (96% - 97%)    Parameters below as of 14 Apr 2023 08:12  Patient On (Oxygen Delivery Method): room air

## 2023-04-14 NOTE — ED BEHAVIORAL HEALTH ASSESSMENT NOTE - NSBHATTESTCOMMENTATTENDFT_PSY_A_CORE
Hold patient for reassessment mood labile endorses suicidal ideation at this time. Patient seen by SBIRT will follow plan.
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
## 2023-04-14 NOTE — ED BEHAVIORAL HEALTH ASSESSMENT NOTE - RISK ASSESSMENT
RF: hx of alcohol abuse, hx of SA, current SI  PF: Caregiver to son, requesting rehab treatment, supportive family

## 2023-04-14 NOTE — ED ADULT NURSE NOTE - AFFECT QUALITY
For information on Fall & Injury Prevention, visit: [email or web address] OR  [email or web address] OR  https://www.cdc.gov/steadi/patient.html Depressed

## 2023-04-14 NOTE — ED ADULT NURSE REASSESSMENT NOTE - NS ED NURSE REASSESS COMMENT FT1
Patient AXOX4.Patient to be transferred to [facility]. Patient to be given librium when EMS Arrives. Patient cooperative and calm. No distress noted.

## 2023-04-14 NOTE — ED BEHAVIORAL HEALTH ASSESSMENT NOTE - REASON
At this time, patient is expressing suicidal ideation, with intent and plan to go out and drink herself to death

## 2024-01-08 NOTE — ED PROVIDER NOTE - CONDITION AT DISCHARGE:
conducted an initial consultation and spiritual assessment for Eryn Howell, who is a 70 y.o.,female.     Initiated a relationship of care and support.   Explored issues of taryn, belief, spirituality and Latter day/ritual needs.  Listened empathically.  Provided information about Spiritual Care Services.  Patient processed feelings about current hospitalization.  Offered prayer and assurance of continued prayers on patients behalf.   Chart reviewed.  Patient expressed gratitude for Spiritual Care visit.    Chaplains will continue to follow and will provide pastoral care as needed or requested.    recommends bedside caregivers page  on duty if patient shows signs of acute spiritual or emotional distress.    Chaplain Thais Garcia M.Div.    [phone] - Office     Patient reports she continues to have some marital problems at home. Per her report her  has been having an affair. She reports is often contributes to her anxiety and stress levels.   Satisfactory
